# Patient Record
Sex: FEMALE | Race: WHITE | NOT HISPANIC OR LATINO | Employment: FULL TIME | URBAN - METROPOLITAN AREA
[De-identification: names, ages, dates, MRNs, and addresses within clinical notes are randomized per-mention and may not be internally consistent; named-entity substitution may affect disease eponyms.]

---

## 2017-11-02 ENCOUNTER — CONVERSION ENCOUNTER (OUTPATIENT)
Dept: GENERAL RADIOLOGY | Facility: HOSPITAL | Age: 47
End: 2017-11-02

## 2018-03-21 ENCOUNTER — OFFICE VISIT CONVERTED (OUTPATIENT)
Dept: FAMILY MEDICINE CLINIC | Facility: CLINIC | Age: 48
End: 2018-03-21
Attending: NURSE PRACTITIONER

## 2018-08-06 ENCOUNTER — OFFICE VISIT CONVERTED (OUTPATIENT)
Dept: FAMILY MEDICINE CLINIC | Facility: CLINIC | Age: 48
End: 2018-08-06
Attending: NURSE PRACTITIONER

## 2018-11-05 ENCOUNTER — CONVERSION ENCOUNTER (OUTPATIENT)
Dept: GENERAL RADIOLOGY | Facility: HOSPITAL | Age: 48
End: 2018-11-05

## 2018-11-27 ENCOUNTER — CONVERSION ENCOUNTER (OUTPATIENT)
Dept: FAMILY MEDICINE CLINIC | Facility: CLINIC | Age: 48
End: 2018-11-27

## 2018-11-27 ENCOUNTER — OFFICE VISIT CONVERTED (OUTPATIENT)
Dept: FAMILY MEDICINE CLINIC | Facility: CLINIC | Age: 48
End: 2018-11-27
Attending: NURSE PRACTITIONER

## 2019-08-26 ENCOUNTER — HOSPITAL ENCOUNTER (OUTPATIENT)
Dept: URGENT CARE | Facility: CLINIC | Age: 49
Discharge: HOME | End: 2019-08-26

## 2019-12-02 ENCOUNTER — HOSPITAL ENCOUNTER (OUTPATIENT)
Dept: FAMILY MEDICINE CLINIC | Facility: CLINIC | Age: 49
Discharge: HOME | End: 2019-12-02
Attending: NURSE PRACTITIONER

## 2019-12-02 ENCOUNTER — OFFICE VISIT CONVERTED (OUTPATIENT)
Dept: FAMILY MEDICINE CLINIC | Facility: CLINIC | Age: 49
End: 2019-12-02
Attending: NURSE PRACTITIONER

## 2019-12-04 ENCOUNTER — HOSPITAL ENCOUNTER (OUTPATIENT)
Dept: LAB | Facility: HOSPITAL | Age: 49
Discharge: HOME | End: 2019-12-04
Attending: NURSE PRACTITIONER

## 2019-12-04 LAB
ALBUMIN SERPL-MCNC: 4.6 G/DL (ref 3.5–5)
ALBUMIN/GLOB SERPL: 1.8 {RATIO} (ref 1.4–2.6)
ALP SERPL-CCNC: 55 U/L (ref 42–98)
ALT SERPL-CCNC: 18 U/L (ref 10–40)
ANION GAP SERPL CALC-SCNC: 16 MMOL/L (ref 8–19)
AST SERPL-CCNC: 22 U/L (ref 15–50)
BASOPHILS # BLD AUTO: 0.04 10*3/UL (ref 0–0.2)
BASOPHILS NFR BLD AUTO: 0.9 % (ref 0–3)
BILIRUB SERPL-MCNC: 0.56 MG/DL (ref 0.2–1.3)
BUN SERPL-MCNC: 20 MG/DL (ref 5–25)
BUN/CREAT SERPL: 24 {RATIO} (ref 6–20)
CALCIUM SERPL-MCNC: 9.7 MG/DL (ref 8.7–10.4)
CHLORIDE SERPL-SCNC: 99 MMOL/L (ref 99–111)
CHOLEST SERPL-MCNC: 224 MG/DL (ref 107–200)
CHOLEST/HDLC SERPL: 3.4 {RATIO} (ref 3–6)
CONV ABS IMM GRAN: 0.01 10*3/UL (ref 0–0.2)
CONV CO2: 27 MMOL/L (ref 22–32)
CONV IMMATURE GRAN: 0.2 % (ref 0–1.8)
CONV TOTAL PROTEIN: 7.2 G/DL (ref 6.3–8.2)
CREAT UR-MCNC: 0.85 MG/DL (ref 0.5–0.9)
DEPRECATED RDW RBC AUTO: 41.7 FL (ref 36.4–46.3)
EOSINOPHIL # BLD AUTO: 0.15 10*3/UL (ref 0–0.7)
EOSINOPHIL # BLD AUTO: 3.2 % (ref 0–7)
ERYTHROCYTE [DISTWIDTH] IN BLOOD BY AUTOMATED COUNT: 12.2 % (ref 11.7–14.4)
GFR SERPLBLD BASED ON 1.73 SQ M-ARVRAT: >60 ML/MIN/{1.73_M2}
GLOBULIN UR ELPH-MCNC: 2.6 G/DL (ref 2–3.5)
GLUCOSE SERPL-MCNC: 91 MG/DL (ref 65–99)
HCT VFR BLD AUTO: 40.6 % (ref 37–47)
HDLC SERPL-MCNC: 66 MG/DL (ref 40–60)
HGB BLD-MCNC: 13.3 G/DL (ref 12–16)
LDLC SERPL CALC-MCNC: 144 MG/DL (ref 70–100)
LYMPHOCYTES # BLD AUTO: 2.11 10*3/UL (ref 1–5)
LYMPHOCYTES NFR BLD AUTO: 45 % (ref 20–45)
MCH RBC QN AUTO: 30.2 PG (ref 27–31)
MCHC RBC AUTO-ENTMCNC: 32.8 G/DL (ref 33–37)
MCV RBC AUTO: 92.3 FL (ref 81–99)
MONOCYTES # BLD AUTO: 0.55 10*3/UL (ref 0.2–1.2)
MONOCYTES NFR BLD AUTO: 11.7 % (ref 3–10)
NEUTROPHILS # BLD AUTO: 1.83 10*3/UL (ref 2–8)
NEUTROPHILS NFR BLD AUTO: 39 % (ref 30–85)
NRBC CBCN: 0 % (ref 0–0.7)
OSMOLALITY SERPL CALC.SUM OF ELEC: 288 MOSM/KG (ref 273–304)
PLATELET # BLD AUTO: 214 10*3/UL (ref 130–400)
PMV BLD AUTO: 12.2 FL (ref 9.4–12.3)
POTASSIUM SERPL-SCNC: 4.2 MMOL/L (ref 3.5–5.3)
RBC # BLD AUTO: 4.4 10*6/UL (ref 4.2–5.4)
SODIUM SERPL-SCNC: 138 MMOL/L (ref 135–147)
TRIGL SERPL-MCNC: 68 MG/DL (ref 40–150)
TSH SERPL-ACNC: 1.77 M[IU]/L (ref 0.27–4.2)
VLDLC SERPL-MCNC: 14 MG/DL (ref 5–37)
WBC # BLD AUTO: 4.69 10*3/UL (ref 4.8–10.8)

## 2019-12-06 LAB
CONV LAST MENSTURAL PERIOD: NORMAL
SPECIMEN SOURCE: NORMAL
SPECIMEN SOURCE: NORMAL
THIN PREP CVX: NORMAL

## 2019-12-17 ENCOUNTER — HOSPITAL ENCOUNTER (OUTPATIENT)
Dept: GENERAL RADIOLOGY | Facility: HOSPITAL | Age: 49
Discharge: HOME | End: 2019-12-17
Attending: NURSE PRACTITIONER

## 2020-10-28 ENCOUNTER — OFFICE VISIT CONVERTED (OUTPATIENT)
Dept: FAMILY MEDICINE CLINIC | Facility: CLINIC | Age: 50
End: 2020-10-28
Attending: NURSE PRACTITIONER

## 2020-11-04 ENCOUNTER — HOSPITAL ENCOUNTER (OUTPATIENT)
Dept: GENERAL RADIOLOGY | Facility: HOSPITAL | Age: 50
Discharge: HOME | End: 2020-11-04
Attending: NURSE PRACTITIONER

## 2020-11-04 ENCOUNTER — HOSPITAL ENCOUNTER (OUTPATIENT)
Dept: LAB | Facility: HOSPITAL | Age: 50
Discharge: HOME | End: 2020-11-04
Attending: NURSE PRACTITIONER

## 2020-11-04 LAB
ALBUMIN SERPL-MCNC: 4.3 G/DL (ref 3.5–5)
ALBUMIN/GLOB SERPL: 1.6 {RATIO} (ref 1.4–2.6)
ALP SERPL-CCNC: 63 U/L (ref 53–141)
ALT SERPL-CCNC: 35 U/L (ref 10–40)
ANION GAP SERPL CALC-SCNC: 20 MMOL/L (ref 8–19)
AST SERPL-CCNC: 29 U/L (ref 15–50)
BASOPHILS # BLD AUTO: 0.05 10*3/UL (ref 0–0.2)
BASOPHILS NFR BLD AUTO: 1.2 % (ref 0–3)
BILIRUB SERPL-MCNC: 0.43 MG/DL (ref 0.2–1.3)
BUN SERPL-MCNC: 19 MG/DL (ref 5–25)
BUN/CREAT SERPL: 21 {RATIO} (ref 6–20)
CALCIUM SERPL-MCNC: 10.3 MG/DL (ref 8.7–10.4)
CHLORIDE SERPL-SCNC: 103 MMOL/L (ref 99–111)
CHOLEST SERPL-MCNC: 253 MG/DL (ref 107–200)
CHOLEST/HDLC SERPL: 3.8 {RATIO} (ref 3–6)
CONV ABS IMM GRAN: 0.02 10*3/UL (ref 0–0.2)
CONV CO2: 24 MMOL/L (ref 22–32)
CONV IMMATURE GRAN: 0.5 % (ref 0–1.8)
CONV TOTAL PROTEIN: 7 G/DL (ref 6.3–8.2)
CREAT UR-MCNC: 0.9 MG/DL (ref 0.5–0.9)
DEPRECATED RDW RBC AUTO: 40.8 FL (ref 36.4–46.3)
EOSINOPHIL # BLD AUTO: 0.2 10*3/UL (ref 0–0.7)
EOSINOPHIL # BLD AUTO: 4.8 % (ref 0–7)
ERYTHROCYTE [DISTWIDTH] IN BLOOD BY AUTOMATED COUNT: 12.3 % (ref 11.7–14.4)
GFR SERPLBLD BASED ON 1.73 SQ M-ARVRAT: >60 ML/MIN/{1.73_M2}
GLOBULIN UR ELPH-MCNC: 2.7 G/DL (ref 2–3.5)
GLUCOSE SERPL-MCNC: 94 MG/DL (ref 65–99)
HCT VFR BLD AUTO: 41.4 % (ref 37–47)
HDLC SERPL-MCNC: 67 MG/DL (ref 40–60)
HGB BLD-MCNC: 13.3 G/DL (ref 12–16)
LDLC SERPL CALC-MCNC: 171 MG/DL (ref 70–100)
LYMPHOCYTES # BLD AUTO: 2.1 10*3/UL (ref 1–5)
LYMPHOCYTES NFR BLD AUTO: 50.1 % (ref 20–45)
MCH RBC QN AUTO: 29.2 PG (ref 27–31)
MCHC RBC AUTO-ENTMCNC: 32.1 G/DL (ref 33–37)
MCV RBC AUTO: 91 FL (ref 81–99)
MONOCYTES # BLD AUTO: 0.43 10*3/UL (ref 0.2–1.2)
MONOCYTES NFR BLD AUTO: 10.3 % (ref 3–10)
NEUTROPHILS # BLD AUTO: 1.39 10*3/UL (ref 2–8)
NEUTROPHILS NFR BLD AUTO: 33.1 % (ref 30–85)
NRBC CBCN: 0 % (ref 0–0.7)
OSMOLALITY SERPL CALC.SUM OF ELEC: 296 MOSM/KG (ref 273–304)
PLATELET # BLD AUTO: 219 10*3/UL (ref 130–400)
PMV BLD AUTO: 11.2 FL (ref 9.4–12.3)
POTASSIUM SERPL-SCNC: 4.5 MMOL/L (ref 3.5–5.3)
RBC # BLD AUTO: 4.55 10*6/UL (ref 4.2–5.4)
SODIUM SERPL-SCNC: 142 MMOL/L (ref 135–147)
TRIGL SERPL-MCNC: 75 MG/DL (ref 40–150)
VLDLC SERPL-MCNC: 15 MG/DL (ref 5–37)
WBC # BLD AUTO: 4.19 10*3/UL (ref 4.8–10.8)

## 2021-01-12 ENCOUNTER — HOSPITAL ENCOUNTER (OUTPATIENT)
Dept: GENERAL RADIOLOGY | Facility: HOSPITAL | Age: 51
Discharge: HOME | End: 2021-01-12
Attending: NURSE PRACTITIONER

## 2021-05-13 NOTE — PROGRESS NOTES
Progress Note      Patient Name: Carmen Vidal   Patient ID: [id number]   Sex: Female   YOB: 1970    Primary Care Provider: Luther LUGO    Visit Date: October 28, 2020    Provider: PARISH Jordan   Location: [facility]   Location Address: [address] [place] [address], [address] [place] [address]  [id number]   Location Phone: [phone]          Chief Complaint     The patient is here for a f/u of menopausal  symptoms.       History Of Present Illness  Carmen Vidal is a 50 year old /White female who presents for evaluation and treatment of:      If can't sit for long periods. has right hip pain on the outside. having pain in back and radiates to feet transiently.    Left hip pain on outside of hip.  Also back pain that radiates to foot at times.       Past Medical History  Disease Name Date Onset Notes   Allergy --  --    Anemia --  --    Arthritis --  --    Hemorrhoids --  --    Night sweats --  --          Past Surgical History  Procedure Name Date Notes   Breast augmentation 2016 --    Tubal ligation 1997 --          Medication List  Name Date Started Instructions   Amberern  1 daily   diclofenac sodium 75 mg oral tablet,delayed release (DR/EC) 10/14/2020 TAKE ONE TABLET BY MOUTH TWICE A DAY for 14 days         Allergy List  Allergen Name Date Reaction Notes   NO KNOWN DRUG ALLERGIES --  --  --        Allergies Reconciled  Social History  Finding Status Start/Stop Quantity Notes   Alcohol --  --/-- --  --    Tobacco Former 22/42 1 ppd smoked 20 years         Immunizations  NameDate Admin Mfg Trade Name Lot Number Route Inj VIS Given VIS Publication   InfluenzaRefused 10/28/2020 NE Not Entered  NE NE     Comments:          Review of Systems  · Constitutional  o Denies  o : fever, fatigue, weight loss, weight gain  · Cardiovascular  o Denies  o : lower extremity edema, claudication, chest pressure, palpitations  · Respiratory  o Denies  o : shortness of  "breath, wheezing, cough, hemoptysis, dyspnea on exertion  · Gastrointestinal  o Denies  o : nausea, vomiting, diarrhea, constipation, abdominal pain      Vitals  Date Time BP Position Site L\R Cuff Size HR RR TEMP (F) WT  HT  BMI kg/m2 BSA m2 O2 Sat FR L/min FiO2 HC       10/28/2020 03:44 /74 Sitting    76 - R 16 97.6 166lbs 16oz 5'  4\" 28.67 1.85             Physical Examination  · Constitutional  o Appearance  o : well-nourished, well developed, alert, in no acute distress  · Eyes  o Conjunctivae  o : conjunctivae normal  o Sclerae  o : sclerae white  o Pupils and Irises  o : pupils equal, round, and reactive to light and accommodation bilaterally  o Corneas  o : tear film normal, no lesions present  o Eyelids/Ocular Adnexae  o : eyelid appearance normal, no exudates present, eye moisture level normal  · Respiratory  o Respiratory Effort  o : breathing unlabored  o Inspection of Chest  o : normal appearance, no retractions  o Auscultation of Lungs  o : normal breath sounds throughout  · Cardiovascular  o Heart  o :   § Auscultation of Heart  § : regular rate and rhythm without murmur, PMI normal  o Peripheral Vascular System  o :   § Extremities  § : no cyanosis, clubbing or edema; less than 2 second refill noted  · Musculoskeletal  o General  o : No joint swelling or deformity noted. Muscle tone, strength and development grossly normal. Pain mid lumbar spine in to paraspinous muscles  · Skin and Subcutaneous Tissue  o General Inspection  o : no rashes or lesions present, no areas of discoloration  · Neurologic  o Mental Status Examination  o : judgement, insight intact, modd and affect appropriate  o Motor Examination  o : strength grossly intact in all four extremities  o Gait and Station  o : normal gait, able to stand without difficulty          Assessment  · Screening for depression     V79.0/Z13.89  · Lumbago     724.2/M54.5  · Hip pain, acute, right     719.45/M25.551  · Elevated " cholesterol     272.0/E78.00       menopausal symptoms.  Stopped Zoloft due to side effects.  If needs to go back on something will call and try another medication.       Plan  · Orders  o Annual depression screening, 15 minutes (34304, ) - V79.0/Z13.89 - 10/28/2020  o ACO-18: Negative screen for clinical depression using a standardized tool () - V79.0/Z13.89 - 10/28/2020  o Lumbar Spine Complete [facility] (99032) - 724.2/M54.5 - 10/28/2020  o CBC with Auto Diff [facility] (55912) - 272.0/E78.00 - 10/28/2020  o CMP [facility] ([id number]) - 272.0/E78.00 - 10/28/2020  o Lipid Panel [facility] (36057) - 272.0/E78.00 - 10/28/2020  o ACO-39: Current medications updated and reviewed (1159F, ) - - 10/28/2020  o ACO-14: Influenza immunization was not administered for reasons documented [facility] () - - 10/28/2020  o Hip (Right) 2 or more views (includes AP Pelvis) X-Ray [facility] Preferred View. ([id number]) - 719.45/M25.551 - 10/28/2020  · Medications  o Medications have been Reconciled  o Transition of Care or Provider Policy  · Instructions  o Depression Screen completed and scanned into the EMR under the designated folder within the patient's documents.  o Today's PHQ-9 result is _5__  o The provider screening met the required time of 15 minutes.  o Patient was educated/instructed on their diagnosis, treatment and medications prior to discharge from the clinic today.  o Minutes spent with patient including greater than 50% in Education/Counseling/Care Coordination.  o Time spent with the patient was minutes, more than 50% face to face.  · Disposition  o F/U in 1 year            Electronically Signed by: PARISH Jordan -Author on October 28, 2020 04:43:50 PM

## 2021-05-14 VITALS
WEIGHT: 167 LBS | HEART RATE: 76 BPM | BODY MASS INDEX: 28.51 KG/M2 | SYSTOLIC BLOOD PRESSURE: 116 MMHG | HEIGHT: 64 IN | DIASTOLIC BLOOD PRESSURE: 74 MMHG | TEMPERATURE: 97.6 F | RESPIRATION RATE: 16 BRPM

## 2021-05-15 VITALS
TEMPERATURE: 97.2 F | DIASTOLIC BLOOD PRESSURE: 79 MMHG | WEIGHT: 154 LBS | RESPIRATION RATE: 16 BRPM | BODY MASS INDEX: 26.29 KG/M2 | SYSTOLIC BLOOD PRESSURE: 128 MMHG | HEIGHT: 64 IN | HEART RATE: 78 BPM | OXYGEN SATURATION: 100 %

## 2021-05-16 VITALS
WEIGHT: 175 LBS | RESPIRATION RATE: 18 BRPM | DIASTOLIC BLOOD PRESSURE: 80 MMHG | HEIGHT: 64 IN | BODY MASS INDEX: 29.88 KG/M2 | TEMPERATURE: 97.6 F | SYSTOLIC BLOOD PRESSURE: 130 MMHG | HEART RATE: 74 BPM | OXYGEN SATURATION: 98 %

## 2021-05-16 VITALS
HEIGHT: 64 IN | RESPIRATION RATE: 16 BRPM | WEIGHT: 180 LBS | BODY MASS INDEX: 30.73 KG/M2 | OXYGEN SATURATION: 96 % | SYSTOLIC BLOOD PRESSURE: 104 MMHG | HEART RATE: 83 BPM | DIASTOLIC BLOOD PRESSURE: 64 MMHG | TEMPERATURE: 98.2 F

## 2021-05-16 VITALS
HEART RATE: 70 BPM | SYSTOLIC BLOOD PRESSURE: 116 MMHG | WEIGHT: 170 LBS | DIASTOLIC BLOOD PRESSURE: 62 MMHG | RESPIRATION RATE: 16 BRPM | TEMPERATURE: 97.1 F | OXYGEN SATURATION: 98 % | BODY MASS INDEX: 29.02 KG/M2 | HEIGHT: 64 IN

## 2021-07-01 RX ORDER — DICLOFENAC SODIUM 75 MG/1
TABLET, DELAYED RELEASE ORAL
Qty: 90 TABLET | Refills: 0 | Status: SHIPPED | OUTPATIENT
Start: 2021-07-01 | End: 2021-09-24 | Stop reason: SDUPTHER

## 2021-09-24 ENCOUNTER — OFFICE VISIT (OUTPATIENT)
Dept: FAMILY MEDICINE CLINIC | Facility: CLINIC | Age: 51
End: 2021-09-24

## 2021-09-24 VITALS
DIASTOLIC BLOOD PRESSURE: 70 MMHG | SYSTOLIC BLOOD PRESSURE: 122 MMHG | TEMPERATURE: 97.1 F | OXYGEN SATURATION: 98 % | BODY MASS INDEX: 27.68 KG/M2 | RESPIRATION RATE: 16 BRPM | WEIGHT: 162.1 LBS | HEART RATE: 67 BPM | HEIGHT: 64 IN

## 2021-09-24 DIAGNOSIS — G89.29 CHRONIC RIGHT-SIDED LOW BACK PAIN WITH RIGHT-SIDED SCIATICA: ICD-10-CM

## 2021-09-24 DIAGNOSIS — M25.551 HIP PAIN, CHRONIC, RIGHT: ICD-10-CM

## 2021-09-24 DIAGNOSIS — R43.9 SMELL DISTURBANCE: ICD-10-CM

## 2021-09-24 DIAGNOSIS — R51.9 NONINTRACTABLE HEADACHE, UNSPECIFIED CHRONICITY PATTERN, UNSPECIFIED HEADACHE TYPE: ICD-10-CM

## 2021-09-24 DIAGNOSIS — L98.9 SKIN LESION: ICD-10-CM

## 2021-09-24 DIAGNOSIS — E78.00 ELEVATED CHOLESTEROL: ICD-10-CM

## 2021-09-24 DIAGNOSIS — Z12.11 SCREEN FOR COLON CANCER: Primary | ICD-10-CM

## 2021-09-24 DIAGNOSIS — M54.41 CHRONIC RIGHT-SIDED LOW BACK PAIN WITH RIGHT-SIDED SCIATICA: ICD-10-CM

## 2021-09-24 DIAGNOSIS — G89.29 HIP PAIN, CHRONIC, RIGHT: ICD-10-CM

## 2021-09-24 PROBLEM — Z88.9 PREDISPOSITION TO ALLERGIC REACTION: Status: ACTIVE | Noted: 2021-09-24

## 2021-09-24 PROBLEM — K64.9 HEMORRHOIDS: Status: ACTIVE | Noted: 2021-09-24

## 2021-09-24 PROBLEM — R61 NIGHT SWEATS: Status: ACTIVE | Noted: 2021-09-24

## 2021-09-24 PROBLEM — D64.9 ANEMIA: Status: ACTIVE | Noted: 2021-09-24

## 2021-09-24 PROBLEM — M19.90 ARTHRITIS: Status: ACTIVE | Noted: 2021-09-24

## 2021-09-24 PROCEDURE — 99214 OFFICE O/P EST MOD 30 MIN: CPT | Performed by: NURSE PRACTITIONER

## 2021-09-24 RX ORDER — DICLOFENAC SODIUM 75 MG/1
75 TABLET, DELAYED RELEASE ORAL DAILY
Qty: 90 TABLET | Refills: 0 | Status: SHIPPED | OUTPATIENT
Start: 2021-09-24 | End: 2021-12-20

## 2021-09-24 NOTE — PROGRESS NOTES
Chief Complaint  Skin Lesion, Hip Pain (left), and Jaw Pain (clenching teeth)    Subjective        Carmen Vidal presents to [facility]  Jaw pain:  Clenching teeth  States isn't stressed.  But if is focused will clench teeth.  Has a bite guard at night.    Hip pain on left hip.  Has been going on for 2.5 years.  Will be a little annoying in the day.  But is now every night.  Can't lay on sides.  Pain on outside of hip.  Sciatic issues.       Skin lesion:  Has an area on right upper arm where a lesion was removed and is not red.    2013 with a change of smell.  Has had a MRI of brain that was negative.  Did go away but has returned.  When occurs will get a headache as well with this episode.  No change in taste and can smell other things.        Past History:    Medical History: has a past medical history of Allergy, Anemia, Arthritis, Hemorrhoids, and Night sweats.     Surgical History: has a past surgical history that includes Breast Augmentation (2016) and Tubal ligation (1997).     Family History: Family history is unknown by patient.     Social History: reports that she has quit smoking. Her smoking use included cigarettes. She smoked 1.00 pack per day. She does not have any smokeless tobacco history on file.    Allergies: Patient has no known allergies.          Current Outpatient Medications:   •  diclofenac (VOLTAREN) 75 MG EC tablet, Take 1 tablet by mouth Daily., Disp: 90 tablet, Rfl: 0    Medications Discontinued During This Encounter   Medication Reason   • diclofenac (VOLTAREN) 75 MG EC tablet Reorder         Review of Systems   Constitutional: Negative for fever.   Respiratory: Negative for cough and shortness of breath.    Cardiovascular: Negative for chest pain, palpitations and leg swelling.   Neurological: Positive for headache. Negative for dizziness, weakness and numbness.        Objective         Vitals:    09/24/21 1025   BP: 122/70   BP Location: Left arm  "  Patient Position: Sitting   Cuff Size: Adult   Pulse: 67   Resp: 16   Temp: 97.1 °F (36.2 °C)   TempSrc: Infrared   SpO2: 98%   Weight: 73.5 kg (162 lb 1.6 oz)   Height: 162.6 cm (64\")     Body mass index is 27.82 kg/m².         Physical Exam  Vitals reviewed.   Constitutional:       Appearance: Normal appearance. She is well-developed.   HENT:      Head: Normocephalic and atraumatic.      Mouth/Throat:      Pharynx: No oropharyngeal exudate.   Eyes:      Conjunctiva/sclera: Conjunctivae normal.      Pupils: Pupils are equal, round, and reactive to light.   Cardiovascular:      Rate and Rhythm: Normal rate and regular rhythm.      Heart sounds: No murmur heard.   No friction rub. No gallop.    Pulmonary:      Effort: Pulmonary effort is normal.      Breath sounds: Normal breath sounds. No wheezing or rhonchi.   Skin:     General: Skin is warm and dry.   Neurological:      Mental Status: She is alert and oriented to person, place, and time.   Psychiatric:         Mood and Affect: Mood and affect normal.         Behavior: Behavior normal.         Thought Content: Thought content normal.         Judgment: Judgment normal.             Result Review :               Assessment and Plan     Diagnoses and all orders for this visit:    1. Screen for colon cancer (Primary)  -     Amb referral for Screening Colonoscopy    2. Chronic right-sided low back pain with right-sided sciatica  -     MRI Lumbar Spine Without Contrast; Future  -     Urinalysis With Culture If Indicated -; Future    3. Hip pain, chronic, right  -     MRI Hip Right Without Contrast; Future    4. Elevated cholesterol  -     Lipid Panel w/ Chol/HDL Ratio; Future  -     Comprehensive metabolic panel; Future    5. Smell disturbance    6. Nonintractable headache, unspecified chronicity pattern, unspecified headache type  -     MRI Brain Without Contrast; Future    7. Skin lesion  -     Ambulatory Referral to Dermatology    Other orders  -     diclofenac " (VOLTAREN) 75 MG EC tablet; Take 1 tablet by mouth Daily.  Dispense: 90 tablet; Refill: 0              Follow Up     Return in about 6 months (around 3/24/2022) for Next scheduled follow up.    Patient was given instructions and counseling regarding her condition or for health maintenance advice. Please see specific information pulled into the AVS if appropriate.

## 2021-10-13 ENCOUNTER — HOSPITAL ENCOUNTER (OUTPATIENT)
Dept: MRI IMAGING | Facility: HOSPITAL | Age: 51
Discharge: HOME | End: 2021-10-13

## 2021-10-13 DIAGNOSIS — G89.29 HIP PAIN, CHRONIC, RIGHT: ICD-10-CM

## 2021-10-13 DIAGNOSIS — G89.29 CHRONIC RIGHT-SIDED LOW BACK PAIN WITH RIGHT-SIDED SCIATICA: ICD-10-CM

## 2021-10-13 DIAGNOSIS — R51.9 NONINTRACTABLE HEADACHE, UNSPECIFIED CHRONICITY PATTERN, UNSPECIFIED HEADACHE TYPE: ICD-10-CM

## 2021-10-13 DIAGNOSIS — M54.41 CHRONIC RIGHT-SIDED LOW BACK PAIN WITH RIGHT-SIDED SCIATICA: ICD-10-CM

## 2021-10-13 DIAGNOSIS — M25.551 HIP PAIN, CHRONIC, RIGHT: ICD-10-CM

## 2021-10-13 PROCEDURE — 73721 MRI JNT OF LWR EXTRE W/O DYE: CPT

## 2021-10-13 PROCEDURE — 70551 MRI BRAIN STEM W/O DYE: CPT

## 2021-10-13 PROCEDURE — 72148 MRI LUMBAR SPINE W/O DYE: CPT

## 2021-10-19 ENCOUNTER — OFFICE VISIT (OUTPATIENT)
Dept: ORTHOPEDIC SURGERY | Facility: CLINIC | Age: 51
End: 2021-10-19

## 2021-10-19 VITALS — HEIGHT: 63 IN | RESPIRATION RATE: 14 BRPM | BODY MASS INDEX: 27.46 KG/M2 | WEIGHT: 155 LBS

## 2021-10-19 DIAGNOSIS — M70.61 TROCHANTERIC BURSITIS OF RIGHT HIP: Primary | ICD-10-CM

## 2021-10-19 PROCEDURE — 99203 OFFICE O/P NEW LOW 30 MIN: CPT | Performed by: ORTHOPAEDIC SURGERY

## 2021-10-19 NOTE — PROGRESS NOTES
"Chief Complaint  Pain of the Right Hip     Subjective      Carmen Vidal presents to [facility] for an evaluation of right hip. Patient states she has been having right hip pain since January of 2019 after moving homes. She has been having lateral sided hip pain ever since that progressively gotten worse overtime. Pain is worse at night. She has pain even at rest. Pain is made worse with laying on the right side. She denies any groin pain. She takes turmeric and diclofenac with little relief. She has a history of low back pain and does have some pain radiating down her leg.     No Known Allergies     Social History     Socioeconomic History   • Marital status:    Tobacco Use   • Smoking status: Former Smoker     Packs/day: 1.00     Types: Cigarettes   • Smokeless tobacco: Never Used   • Tobacco comment: SMOKED FOR 20 YEARS        Review of Systems     Objective   Vital Signs:   Resp 14   Ht 160 cm (63\")   Wt 70.3 kg (155 lb)   BMI 27.46 kg/m²       Physical Exam  Constitutional:       Appearance: Normal appearance. The patient is well-developed and normal weight.   HENT:      Head: Normocephalic.      Right Ear: Hearing and external ear normal.      Left Ear: Hearing and external ear normal.      Nose: Nose normal.   Eyes:      Conjunctiva/sclera: Conjunctivae normal.   Cardiovascular:      Rate and Rhythm: Normal rate.   Pulmonary:      Effort: Pulmonary effort is normal.      Breath sounds: No wheezing or rales.   Abdominal:      Palpations: Abdomen is soft.      Tenderness: There is no abdominal tenderness.   Musculoskeletal:      Cervical back: Normal range of motion.   Skin:     Findings: No rash.   Neurological:      Mental Status: The patient is alert and oriented to person, place, and time.   Psychiatric:         Mood and Affect: Mood and affect normal.         Judgment: Judgment normal.       Ortho Exam      RIGHT HIP: Active flexion. Tender lateral hip. 5 out of 5 " strength. Neurovascular intact. Negative leg raise. No groin pain. Good tone of hip flexors, hip extensors, hip adductor, hip abductors.     Procedures      Imaging Results (Most Recent)     None           Result Review :     MRI Hip Right Without Contrast    Result Date: 10/13/2021  Narrative: PROCEDURE: MRI HIP RIGHT WO CONTRAST  COMPARISON:  None INDICATIONS: RIGHT HIP PAIN RADIATING DOWN RIGHT LEG WITH NUMBNESS AND TINGLING.      TECHNIQUE: A comprehensive examination was performed utilizing a variety of imaging planes and imaging parameters to optimize visualization of suspected pathology.  Images were performed without contrast.  FINDINGS:  Today's study is compared to previous plain film images of the right hip joint and pelvis performed on 11/4/2020.  Today's study reveals a small focal area of increased T2 weighted signal in the area of the right quadratus femora is muscle located posterior inferior to the right hip joint.  This finding is most consistent with inflammation and edema caused by trauma and partial tearing of this muscle.  No evidence of abnormality in the area the right sciatic nerve.  No evidence of fracture or dislocation or effusion or bone tumor or a vascular necrosis in the right hip joint.  No evidence of arthritis in the right hip joint no evidence of mass or adenopathy or fluid within the pelvis.  The uterus and adnexal areas are normal.  The left hip joint is normal.  Mild inflammation and edema in the muscular attachments overlying the right and left greater trochanter of the right left hip joints more prominent on the left than on the right consistent with mild chronic trauma and injury.  CONCLUSION:  1. Small focal area of increased T2 weighted signal in the right quadratus femoris muscle located posterior inferior to the right hip joint consistent with inflammation and edema caused by trauma and partial tearing.     BRUCE CODY MD       Electronically Signed and Approved  By: BRUCE CODY MD on 10/13/2021 at 11:14                 HIP X-RAY    PROCEDURE: 2 VIEW RIGHT HIP  WITH SINGLE VIEW AP PELVIS         COMPARISON: None.         INDICATIONS: CHRONIC RIGHT HIP PAIN. NO KNOWN INJURY. PAIN IS LATERAL AND NOW KEEPS HER UP AT     NIGHT.         FINDINGS:     Mild hip arthrosis.  There is no fracture, dislocation, or aggressive appearing bone change.         CONCLUSION: No acute findings.  Mild degenerative change.          ALFONSO PADILLA MD           Electronically Signed and Approved     Assessment and Plan     DX: Right hip trochanteric bursitis    Reviewed x-rays and MRI with the patient. Discussed diagnosis and treatment plans with the patient. Advised patient should try a steroid injection however she denies this. She opted for home exercises program which was provided. May consider injection next visit.     Call or return if worsening symptoms.    Follow Up     6-8 weeks.       Patient was given instructions and counseling regarding her condition or for health maintenance advice. Please see specific information pulled into the AVS if appropriate.     Scribed for Luis Carlos Damon MD by Corina Fernandez.   10/19/21   15:20 EDT    I have personally performed the services described in this document as scribed by the above individual and it is both accurate and complete. Luis Carlos Damon MD 10/25/21

## 2021-11-01 ENCOUNTER — PREP FOR SURGERY (OUTPATIENT)
Dept: OTHER | Facility: HOSPITAL | Age: 51
End: 2021-11-01

## 2021-11-01 ENCOUNTER — OFFICE VISIT (OUTPATIENT)
Dept: SURGERY | Facility: CLINIC | Age: 51
End: 2021-11-01

## 2021-11-01 VITALS — OXYGEN SATURATION: 99 % | BODY MASS INDEX: 27.49 KG/M2 | HEIGHT: 63 IN | HEART RATE: 71 BPM | WEIGHT: 155.13 LBS

## 2021-11-01 DIAGNOSIS — Z12.11 SCREENING FOR MALIGNANT NEOPLASM OF COLON: Primary | ICD-10-CM

## 2021-11-01 PROCEDURE — 99202 OFFICE O/P NEW SF 15 MIN: CPT | Performed by: NURSE PRACTITIONER

## 2021-11-01 RX ORDER — POLYETHYLENE GLYCOL 3350 17 G/17G
POWDER, FOR SOLUTION ORAL
Qty: 238 PACKET | Refills: 0 | Status: SHIPPED | OUTPATIENT
Start: 2021-11-01

## 2021-11-01 RX ORDER — SODIUM CHLORIDE 0.9 % (FLUSH) 0.9 %
3 SYRINGE (ML) INJECTION EVERY 12 HOURS SCHEDULED
Status: CANCELLED | OUTPATIENT
Start: 2021-11-01

## 2021-11-01 RX ORDER — SODIUM CHLORIDE 0.9 % (FLUSH) 0.9 %
10 SYRINGE (ML) INJECTION AS NEEDED
Status: CANCELLED | OUTPATIENT
Start: 2021-11-01

## 2021-11-01 NOTE — PROGRESS NOTES
"Chief Complaint: Colonoscopy (Patient has never had a colonoscopy before. No FHx of Colon Cancer. Patient states she struggles with constipation.) and Constipation    Subjective      Colonoscopy consultation         History of Present Illness  Carmen Vidal is a 51 y.o. female presents to [facility] for colonoscopy consultation.    Patient presents today on referral from [facility] for colonoscopy consultation.    Patient denies any abdominal pain, change in bowel habit, rectal bleeding.    Denies any family history of colorectal cancer.    No previous colonoscopy.    Objective     Past Medical History:   Diagnosis Date   • Allergy    • Anemia    • Arthritis    • Hemorrhoids    • Night sweats        Past Surgical History:   Procedure Laterality Date   • BREAST AUGMENTATION  2016   • TUBAL ABDOMINAL LIGATION  1997         Current Outpatient Medications:   •  diclofenac (VOLTAREN) 75 MG EC tablet, Take 1 tablet by mouth Daily., Disp: 90 tablet, Rfl: 0  •  polyethylene glycol (MIRALAX) 17 g packet, Take as directed.  Instructions given in office.  Dispense: 238 g bottle, Disp: 238 packet, Rfl: 0    No Known Allergies     Family History   Family history unknown: Yes       Social History     Socioeconomic History   • Marital status:    Tobacco Use   • Smoking status: Former Smoker     Packs/day: 1.00     Years: 20.00     Pack years: 20.00     Types: Cigarettes   • Smokeless tobacco: Never Used   Vaping Use   • Vaping Use: Never used   Substance and Sexual Activity   • Alcohol use: Yes     Comment: Ocassionally    • Drug use: Never   • Sexual activity: Defer       Review of Systems   Constitutional: Negative for chills and fever.   Gastrointestinal: Negative for abdominal distention, abdominal pain, anal bleeding, blood in stool, constipation, diarrhea and rectal pain.        Vital Signs:   Pulse 71   Ht 160 cm (63\")   Wt 70.4 kg (155 lb 2 oz)   SpO2 99%   BMI 27.48 " kg/m²      Physical Exam  Constitutional:       Appearance: Normal appearance.   HENT:      Head: Normocephalic.   Cardiovascular:      Rate and Rhythm: Normal rate.   Pulmonary:      Effort: Pulmonary effort is normal.   Abdominal:      General: Abdomen is flat.      Palpations: Abdomen is soft.   Skin:     General: Skin is warm and dry.   Neurological:      General: No focal deficit present.      Mental Status: She is alert and oriented to person, place, and time.   Psychiatric:         Mood and Affect: Mood normal.         Thought Content: Thought content normal.          Result Review :              []  Laboratory  []  Radiology  []  Pathology  []  Microbiology  []  EKG/Telemetry   []  Cardiology/Vascular   [x]  Old records  Today I have reviewed Luther Milton's previous office note.     Assessment and Plan    Diagnoses and all orders for this visit:    1. Screening for malignant neoplasm of colon (Primary)    Other orders  -     polyethylene glycol (MIRALAX) 17 g packet; Take as directed.  Instructions given in office.  Dispense: 238 g bottle  Dispense: 238 packet; Refill: 0        Follow Up   Return for Scheduled colonoscopy with Dr. Carty on 2/2/2022 at [facility].     Hospital arrival time is 6 AM    Possible risk/complications, benefits, and alternatives to surgical or invasive procedure have been explained to patient and/or legal guardian. Patient has been evaluated and can tolerate anesthesia and/or sedation. Risks, benefits, and alternatives to anesthesia and sedation have been explained to patient and/or legal guardian.  Patient verbalizes understanding is willing to proceed with the above plan.    Patient was given instructions and counseling regarding her condition or for health maintenance advice. Please see specific information pulled into the AVS if appropriate.

## 2021-11-29 PROBLEM — M70.61 TROCHANTERIC BURSITIS OF RIGHT HIP: Status: ACTIVE | Noted: 2021-11-29

## 2021-11-30 ENCOUNTER — OFFICE VISIT (OUTPATIENT)
Dept: ORTHOPEDIC SURGERY | Facility: CLINIC | Age: 51
End: 2021-11-30

## 2021-11-30 VITALS — BODY MASS INDEX: 26.82 KG/M2 | HEIGHT: 63 IN | WEIGHT: 151.4 LBS | OXYGEN SATURATION: 97 % | HEART RATE: 75 BPM

## 2021-11-30 DIAGNOSIS — M70.61 TROCHANTERIC BURSITIS OF RIGHT HIP: Primary | ICD-10-CM

## 2021-11-30 PROCEDURE — 99212 OFFICE O/P EST SF 10 MIN: CPT | Performed by: PHYSICIAN ASSISTANT

## 2021-11-30 NOTE — PROGRESS NOTES
"Chief Complaint  Pain and Follow-up of the Right Hip    Subjective          Carmen Vidal is a 51 y.o. female  presents to [facility] for   History of Present Illness    Patient presents for follow-up evaluation of right hip pain, right hip trochanteric bursitis.  Patient was last evaluated by Dr. Damon on 10/19/2021 he discussed treatment options with her including steroid injection/home exercise program, patient refused injection at that visit she states she has been working on weight loss she is doing home exercises watching her diet and she states this has been helping her hip pain.  She states she still has pain when she sits for long periods in the lateral hip she also states she has pain when laying on that side of her hip.  Patient would like to avoid injections at this time.  No new complaints.  No Known Allergies     Social History     Socioeconomic History   • Marital status:    Tobacco Use   • Smoking status: Former Smoker     Packs/day: 1.00     Years: 20.00     Pack years: 20.00     Types: Cigarettes   • Smokeless tobacco: Never Used   Vaping Use   • Vaping Use: Never used   Substance and Sexual Activity   • Alcohol use: Yes     Comment: Ocassionally    • Drug use: Never   • Sexual activity: Defer        REVIEW OF SYSTEMS    Constitutional: Denies fevers, chills, weight loss  Cardiovascular: Denies chest pain, shortness of breath  Skin: Denies rashes, acute skin changes  Neurologic: Denies headache, loss of consciousness  MSK: Right hip pain      Objective   Vital Signs:   Pulse 75   Ht 160 cm (63\")   Wt 68.7 kg (151 lb 6.4 oz)   SpO2 97%   BMI 26.82 kg/m²     Body mass index is 26.82 kg/m².    Physical Exam    Right hip: Tender to palpation of the lateral hip over the greater trochanter, 5 out of 5 strength, neurovascular intact, negative straight leg raise, no pain with rotation, full range of motion.    Procedures    Imaging Results (Most Recent)     " None           Result Review :{Labs  Result Review  Imaging  Med Tab  Media  Procedures :23}   The following data was reviewed by: HERMES Fitzgerald on 11/30/2021:               Assessment and Plan    Diagnoses and all orders for this visit:    1. Trochanteric bursitis of right hip (Primary)        Discussed diagnosis and treatment options with the patient patient will follow up as needed we discussed future injections as needed.  She will consider this for the future.    Call or return if worsening symptoms.    Follow Up   Return if symptoms worsen or fail to improve.  Patient was given instructions and counseling regarding her condition or for health maintenance advice. Please see specific information pulled into the AVS if appropriate.

## 2021-12-20 RX ORDER — DICLOFENAC SODIUM 75 MG/1
TABLET, DELAYED RELEASE ORAL
Qty: 90 TABLET | Refills: 0 | Status: ON HOLD | OUTPATIENT
Start: 2021-12-20 | End: 2022-02-02

## 2022-01-05 ENCOUNTER — OFFICE VISIT (OUTPATIENT)
Dept: FAMILY MEDICINE CLINIC | Facility: CLINIC | Age: 52
End: 2022-01-05

## 2022-01-05 VITALS
SYSTOLIC BLOOD PRESSURE: 116 MMHG | TEMPERATURE: 98 F | HEIGHT: 63 IN | WEIGHT: 152.8 LBS | OXYGEN SATURATION: 98 % | BODY MASS INDEX: 27.07 KG/M2 | RESPIRATION RATE: 18 BRPM | DIASTOLIC BLOOD PRESSURE: 74 MMHG | HEART RATE: 75 BPM

## 2022-01-05 DIAGNOSIS — L90.5 HEALING SCAR: Primary | ICD-10-CM

## 2022-01-05 PROCEDURE — 99213 OFFICE O/P EST LOW 20 MIN: CPT | Performed by: NURSE PRACTITIONER

## 2022-01-05 NOTE — PROGRESS NOTES
"Chief Complaint  Post-op Problem (incision - has a stich)    Subjective        Carmen Vidal presents to [facility]  Incision:  Having issues with getting extension of elbow. Having a pulling sensation and still can't completely extend due to scar in elbow and arm.  Has pulling sensation.        Past History:    Medical History: has a past medical history of Allergy, Anemia, Arthritis, Hemorrhoids, and Night sweats.     Surgical History: has a past surgical history that includes Breast Augmentation (2016); Tubal ligation (1997); and Skin cancer excision.     Family History: family history includes Breast cancer in her maternal grandmother; Cervical cancer in her maternal grandmother; Diabetes in her maternal grandfather.     Social History: reports that she has quit smoking. Her smoking use included cigarettes. She has a 20.00 pack-year smoking history. She has never used smokeless tobacco. She reports current alcohol use. She reports that she does not use drugs.    Allergies: Patient has no known allergies.          Current Outpatient Medications:   •  diclofenac (VOLTAREN) 75 MG EC tablet, TAKE ONE TABLET BY MOUTH DAILY, Disp: 90 tablet, Rfl: 0  •  polyethylene glycol (MIRALAX) 17 g packet, Take as directed.  Instructions given in office.  Dispense: 238 g bottle, Disp: 238 packet, Rfl: 0    There are no discontinued medications.      Review of Systems   Constitutional: Negative for fever.   Respiratory: Negative for cough and shortness of breath.    Cardiovascular: Negative for chest pain, palpitations and leg swelling.   Neurological: Negative for dizziness, weakness, numbness and headache.        Objective         Vitals:    01/05/22 0938   BP: 116/74   BP Location: Right arm   Patient Position: Sitting   Cuff Size: Adult   Pulse: 75   Resp: 18   Temp: 98 °F (36.7 °C)   TempSrc: Infrared   SpO2: 98%   Weight: 69.3 kg (152 lb 12.8 oz)   Height: 160 cm (63\")     Body mass index " is 27.07 kg/m².         Physical Exam  Vitals reviewed.   Constitutional:       Appearance: Normal appearance. She is well-developed.   HENT:      Head: Normocephalic and atraumatic.      Mouth/Throat:      Pharynx: No oropharyngeal exudate.   Eyes:      Conjunctiva/sclera: Conjunctivae normal.      Pupils: Pupils are equal, round, and reactive to light.   Cardiovascular:      Rate and Rhythm: Normal rate and regular rhythm.      Heart sounds: No murmur heard.  No friction rub. No gallop.    Pulmonary:      Effort: Pulmonary effort is normal.      Breath sounds: Normal breath sounds. No wheezing or rhonchi.   Skin:     General: Skin is warm and dry.             Comments: Scar noted into inner arm. And AC space.   Neurological:      Mental Status: She is alert and oriented to person, place, and time.   Psychiatric:         Mood and Affect: Mood and affect normal.         Behavior: Behavior normal.         Thought Content: Thought content normal.         Judgment: Judgment normal.             Result Review :               Assessment and Plan     Diagnoses and all orders for this visit:    1. Healing scar (Primary)  Comments:  gave exercise to help with extension. and may take some months for sutrue to dissolve.              Follow Up     Return for Next scheduled follow up.    Patient was given instructions and counseling regarding her condition or for health maintenance advice. Please see specific information pulled into the AVS if appropriate.

## 2022-02-01 NOTE — H&P
Chief Complaint: No chief complaint on file.    Subjective      Colonoscopy consultation         History of Present Illness  Carmen Vidal is a 52 y.o. female presents to [facility] for colonoscopy consultation.    Patient presents today on referral from Luther Milton for colonoscopy consultation.    Patient denies any abdominal pain, change in bowel habit, rectal bleeding.    Denies any family history of colorectal cancer.    No previous colonoscopy.    Objective     Past Medical History:   Diagnosis Date   • Allergy    • Anemia    • Arthritis    • Hemorrhoids    • Night sweats        Past Surgical History:   Procedure Laterality Date   • BREAST AUGMENTATION  2016   • SKIN CANCER EXCISION     • TUBAL ABDOMINAL LIGATION  1997       No current facility-administered medications for this encounter.    Current Outpatient Medications:   •  diclofenac (VOLTAREN) 75 MG EC tablet, TAKE ONE TABLET BY MOUTH DAILY (Patient taking differently: Take 75 mg by mouth Every Night.), Disp: 90 tablet, Rfl: 0  •  polyethylene glycol (MIRALAX) 17 g packet, Take as directed.  Instructions given in office.  Dispense: 238 g bottle, Disp: 238 packet, Rfl: 0    No Known Allergies     Family History   Problem Relation Age of Onset   • Breast cancer Maternal Grandmother    • Cervical cancer Maternal Grandmother    • Diabetes Maternal Grandfather    • Malig Hyperthermia Neg Hx        Social History     Socioeconomic History   • Marital status:    Tobacco Use   • Smoking status: Former Smoker     Packs/day: 1.00     Years: 20.00     Pack years: 20.00     Types: Cigarettes   • Smokeless tobacco: Never Used   • Tobacco comment: quit-    Vaping Use   • Vaping Use: Never used   Substance and Sexual Activity   • Alcohol use: Yes     Comment: occasionally   • Drug use: Never   • Sexual activity: Defer         Vital Signs:   There were no vitals taken for this visit.     Physical Exam  Constitutional:        Appearance: Normal appearance.   HENT:      Head: Normocephalic.   Cardiovascular:      Rate and Rhythm: Normal rate.   Pulmonary:      Effort: Pulmonary effort is normal.   Abdominal:      General: Abdomen is flat.      Palpations: Abdomen is soft.   Skin:     General: Skin is warm and dry.   Neurological:      General: No focal deficit present.      Mental Status: She is alert and oriented to person, place, and time.   Psychiatric:         Mood and Affect: Mood normal.         Thought Content: Thought content normal.        Assessment   Screening colonoscopy    Plan  Colonoscopy    Risks and benefits discussed    Flash Carty M.D.  02/01/22    Electronically signed by Flash Carty MD, 02/01/22, 3:12 PM EST.

## 2022-02-02 ENCOUNTER — HOSPITAL ENCOUNTER (OUTPATIENT)
Facility: HOSPITAL | Age: 52
Setting detail: HOSPITAL OUTPATIENT SURGERY
Discharge: HOME | End: 2022-02-02
Attending: SURGERY | Admitting: SURGERY

## 2022-02-02 ENCOUNTER — ANESTHESIA (OUTPATIENT)
Dept: GASTROENTEROLOGY | Facility: HOSPITAL | Age: 52
End: 2022-02-02

## 2022-02-02 ENCOUNTER — ANESTHESIA EVENT (OUTPATIENT)
Dept: GASTROENTEROLOGY | Facility: HOSPITAL | Age: 52
End: 2022-02-02

## 2022-02-02 VITALS
TEMPERATURE: 97 F | BODY MASS INDEX: 26.95 KG/M2 | HEART RATE: 67 BPM | DIASTOLIC BLOOD PRESSURE: 78 MMHG | WEIGHT: 152.12 LBS | SYSTOLIC BLOOD PRESSURE: 112 MMHG | OXYGEN SATURATION: 100 % | RESPIRATION RATE: 16 BRPM

## 2022-02-02 DIAGNOSIS — Z12.11 SCREENING FOR MALIGNANT NEOPLASM OF COLON: ICD-10-CM

## 2022-02-02 PROCEDURE — 25010000002 PROPOFOL 10 MG/ML EMULSION: Performed by: NURSE ANESTHETIST, CERTIFIED REGISTERED

## 2022-02-02 RX ORDER — SODIUM CHLORIDE, SODIUM LACTATE, POTASSIUM CHLORIDE, CALCIUM CHLORIDE 600; 310; 30; 20 MG/100ML; MG/100ML; MG/100ML; MG/100ML
30 INJECTION, SOLUTION INTRAVENOUS CONTINUOUS
Status: DISCONTINUED | OUTPATIENT
Start: 2022-02-02 | End: 2022-02-02 | Stop reason: HOSPADM

## 2022-02-02 RX ORDER — SODIUM CHLORIDE 0.9 % (FLUSH) 0.9 %
10 SYRINGE (ML) INJECTION AS NEEDED
Status: DISCONTINUED | OUTPATIENT
Start: 2022-02-02 | End: 2022-02-02 | Stop reason: HOSPADM

## 2022-02-02 RX ORDER — SODIUM CHLORIDE 0.9 % (FLUSH) 0.9 %
3 SYRINGE (ML) INJECTION EVERY 12 HOURS SCHEDULED
Status: DISCONTINUED | OUTPATIENT
Start: 2022-02-02 | End: 2022-02-02 | Stop reason: HOSPADM

## 2022-02-02 RX ORDER — LIDOCAINE HYDROCHLORIDE 20 MG/ML
INJECTION, SOLUTION INFILTRATION; PERINEURAL AS NEEDED
Status: DISCONTINUED | OUTPATIENT
Start: 2022-02-02 | End: 2022-02-02 | Stop reason: SURG

## 2022-02-02 RX ORDER — PROPOFOL 10 MG/ML
VIAL (ML) INTRAVENOUS AS NEEDED
Status: DISCONTINUED | OUTPATIENT
Start: 2022-02-02 | End: 2022-02-02 | Stop reason: SURG

## 2022-02-02 RX ADMIN — PROPOFOL 200 MCG/KG/MIN: 10 INJECTION, EMULSION INTRAVENOUS at 07:20

## 2022-02-02 RX ADMIN — PROPOFOL 50 MG: 10 INJECTION, EMULSION INTRAVENOUS at 07:20

## 2022-02-02 RX ADMIN — SODIUM CHLORIDE, POTASSIUM CHLORIDE, SODIUM LACTATE AND CALCIUM CHLORIDE 30 ML/HR: 600; 310; 30; 20 INJECTION, SOLUTION INTRAVENOUS at 06:50

## 2022-02-02 RX ADMIN — LIDOCAINE HYDROCHLORIDE 100 MG: 20 INJECTION, SOLUTION INFILTRATION; PERINEURAL at 07:20

## 2022-02-02 NOTE — DISCHARGE INSTRUCTIONS
Dr. Carty's Instructions       • Next colonoscopy in 10 years.    • Follow-up with your primary care clinician at your next scheduled appointment time.

## 2022-02-02 NOTE — ANESTHESIA POSTPROCEDURE EVALUATION
Patient: Carmen Vidal    Procedure Summary     Date: 02/02/22 Room / Location: [facility] ENDOSCOPY 1 / [facility] ENDOSCOPY    Anesthesia Start: 0718 Anesthesia Stop: 0746    Procedure: COLONOSCOPY with possible biopies. (N/A ) Diagnosis:       Screening for malignant neoplasm of colon      (Screening for malignant neoplasm of colon [Z12.11])    Surgeons: Flash Carty MD Provider: Matt Fishman MD    Anesthesia Type: general ASA Status: 2          Anesthesia Type: general    Vitals  Vitals Value Taken Time   BP 91/51 02/02/22 0749   Temp 36.1 °C (97 °F) 02/02/22 0745   Pulse 61 02/02/22 0752   Resp 12 02/02/22 0745   SpO2 99 % 02/02/22 0752   Vitals shown include unvalidated device data.        Post Anesthesia Care and Evaluation    Patient location during evaluation: bedside  Patient participation: complete - patient participated  Level of consciousness: awake  Pain management: adequate  Airway patency: patent  Anesthetic complications: No anesthetic complications  PONV Status: none  Cardiovascular status: acceptable and stable  Respiratory status: acceptable  Hydration status: acceptable    Comments: An Anesthesiologist personally participated in the most demanding procedures (including induction and emergence if applicable) in the anesthesia plan, monitored the course of anesthesia administration at frequent intervals and remained physically present and available for immediate diagnosis and treatment of emergencies.

## 2022-02-02 NOTE — ANESTHESIA PREPROCEDURE EVALUATION
Anesthesia Evaluation     Patient summary reviewed and Nursing notes reviewed   no history of anesthetic complications:  NPO Solid Status: > 8 hours  NPO Liquid Status: > 2 hours           Airway   Mallampati: II  TM distance: >3 FB  Neck ROM: full  No difficulty expected  Dental      Pulmonary - negative pulmonary ROS and normal exam    breath sounds clear to auscultation  Cardiovascular - negative cardio ROS and normal exam  Exercise tolerance: good (4-7 METS)    Rhythm: regular  Rate: normal        Neuro/Psych- negative ROS  GI/Hepatic/Renal/Endo - negative ROS     Musculoskeletal     Abdominal    Substance History - negative use     OB/GYN negative ob/gyn ROS         Other   arthritis,                      Anesthesia Plan    ASA 2     general   total IV anesthesia    Anesthetic plan, all risks, benefits, and alternatives have been provided, discussed and informed consent has been obtained with: patient.        CODE STATUS:

## 2022-03-18 RX ORDER — DICLOFENAC SODIUM 75 MG/1
TABLET, DELAYED RELEASE ORAL
Qty: 90 TABLET | Refills: 1 | Status: SHIPPED | OUTPATIENT
Start: 2022-03-18 | End: 2022-09-09

## 2022-03-18 NOTE — TELEPHONE ENCOUNTER
Please advise medication   Last visit:1/5/2022  Next visit:na    Medication was discontinued by PCP on 12/20/21

## 2022-05-29 ENCOUNTER — PATIENT MESSAGE (OUTPATIENT)
Dept: FAMILY MEDICINE CLINIC | Facility: CLINIC | Age: 52
End: 2022-05-29

## 2022-05-29 DIAGNOSIS — M54.50 LOW BACK PAIN, UNSPECIFIED BACK PAIN LATERALITY, UNSPECIFIED CHRONICITY, UNSPECIFIED WHETHER SCIATICA PRESENT: Primary | ICD-10-CM

## 2022-06-01 NOTE — TELEPHONE ENCOUNTER
From: Carmen Vidal  To: PARISH Jordan  Sent: 5/29/2022 5:20 PM EDT  Subject: Chiropractic referral    Is it possible to get a referral to visit a chiropractor? I feel like it might be beneficial, I'm still having some issues with low back/sciatica.   Thanks, Carmen

## 2022-06-06 ENCOUNTER — OFFICE VISIT (OUTPATIENT)
Dept: FAMILY MEDICINE CLINIC | Facility: CLINIC | Age: 52
End: 2022-06-06

## 2022-06-06 VITALS
HEIGHT: 63 IN | DIASTOLIC BLOOD PRESSURE: 76 MMHG | BODY MASS INDEX: 27.71 KG/M2 | SYSTOLIC BLOOD PRESSURE: 124 MMHG | OXYGEN SATURATION: 98 % | RESPIRATION RATE: 16 BRPM | TEMPERATURE: 98.4 F | WEIGHT: 156.4 LBS | HEART RATE: 60 BPM

## 2022-06-06 DIAGNOSIS — L24.9 IRRITANT CONTACT DERMATITIS, UNSPECIFIED TRIGGER: Primary | ICD-10-CM

## 2022-06-06 PROCEDURE — 99213 OFFICE O/P EST LOW 20 MIN: CPT | Performed by: NURSE PRACTITIONER

## 2022-06-06 RX ORDER — TRIAMCINOLONE ACETONIDE 40 MG/ML
40 INJECTION, SUSPENSION INTRA-ARTICULAR; INTRAMUSCULAR ONCE
Status: DISCONTINUED | OUTPATIENT
Start: 2022-06-06 | End: 2022-06-06

## 2022-06-06 RX ORDER — METHYLPREDNISOLONE 4 MG/1
TABLET ORAL
Qty: 21 TABLET | Refills: 0 | Status: SHIPPED | OUTPATIENT
Start: 2022-06-06 | End: 2022-06-13 | Stop reason: SDUPTHER

## 2022-06-06 NOTE — PROGRESS NOTES
Chief Complaint  Rash    Subjective        Carmen Vidal presents to [facility]  Was out in the yard and was pulling weeds but also had farmers spraying at the end of the road.  Only has rash where skin exposed.    Rash  Pertinent negatives include no cough, fever or shortness of breath.         Past History:    Medical History: has a past medical history of Allergy, Anemia, Arthritis, Hemorrhoids, and Night sweats.     Surgical History: has a past surgical history that includes Breast Augmentation (2016); Tubal ligation (1997); Skin cancer excision; and Colonoscopy (N/A, 2/2/2022).     Family History: family history includes Breast cancer in her maternal grandmother; Cervical cancer in her maternal grandmother; Diabetes in her maternal grandfather.     Social History: reports that she has quit smoking. Her smoking use included cigarettes. She has a 20.00 pack-year smoking history. She has never used smokeless tobacco. She reports current alcohol use. She reports that she does not use drugs.    Allergies: Patient has no known allergies.          Current Outpatient Medications:   •  diclofenac (VOLTAREN) 75 MG EC tablet, TAKE ONE TABLET BY MOUTH DAILY, Disp: 90 tablet, Rfl: 1  •  methylPREDNISolone (MEDROL) 4 MG dose pack, Take as directed on package instructions., Disp: 21 tablet, Rfl: 0  •  polyethylene glycol (MIRALAX) 17 g packet, Take as directed.  Instructions given in office.  Dispense: 238 g bottle, Disp: 238 packet, Rfl: 0  No current facility-administered medications for this visit.    Medications Discontinued During This Encounter   Medication Reason   • triamcinolone acetonide (KENALOG-40) injection 40 mg          Review of Systems   Constitutional: Negative for fever.   Respiratory: Negative for cough and shortness of breath.    Cardiovascular: Negative for chest pain, palpitations and leg swelling.   Skin: Positive for rash.   Neurological: Negative for dizziness,  "weakness, numbness and headache.        Objective         Vitals:    06/06/22 1357   BP: 124/76   BP Location: Right arm   Patient Position: Sitting   Cuff Size: Adult   Pulse: 60   Resp: 16   Temp: 98.4 °F (36.9 °C)   TempSrc: Infrared   SpO2: 98%   Weight: 70.9 kg (156 lb 6.4 oz)   Height: 160 cm (62.99\")     Body mass index is 27.71 kg/m².         Physical Exam  Vitals reviewed.   Constitutional:       Appearance: Normal appearance. She is well-developed.   HENT:      Head: Normocephalic and atraumatic.      Mouth/Throat:      Pharynx: No oropharyngeal exudate.   Eyes:      Conjunctiva/sclera: Conjunctivae normal.      Pupils: Pupils are equal, round, and reactive to light.   Cardiovascular:      Rate and Rhythm: Normal rate and regular rhythm.      Heart sounds: Normal heart sounds. No murmur heard.    No friction rub. No gallop.   Pulmonary:      Effort: Pulmonary effort is normal.      Breath sounds: Normal breath sounds. No wheezing or rhonchi.   Skin:     General: Skin is warm and dry.      Findings: Rash present. Rash is urticarial and vesicular.          Neurological:      Mental Status: She is alert and oriented to person, place, and time.   Psychiatric:         Mood and Affect: Mood and affect normal.         Behavior: Behavior normal.         Thought Content: Thought content normal.         Judgment: Judgment normal.             Result Review :               Assessment and Plan     Diagnoses and all orders for this visit:    1. Irritant contact dermatitis, unspecified trigger (Primary)  -     Discontinue: triamcinolone acetonide (KENALOG-40) injection 40 mg  -     methylPREDNISolone (MEDROL) 4 MG dose pack; Take as directed on package instructions.  Dispense: 21 tablet; Refill: 0              Follow Up     Return for Next scheduled follow up.    Patient was given instructions and counseling regarding her condition or for health maintenance advice. Please see specific information pulled into the AVS if " appropriate.

## 2022-06-13 ENCOUNTER — PATIENT MESSAGE (OUTPATIENT)
Dept: FAMILY MEDICINE CLINIC | Facility: CLINIC | Age: 52
End: 2022-06-13

## 2022-06-13 DIAGNOSIS — L24.9 IRRITANT CONTACT DERMATITIS, UNSPECIFIED TRIGGER: ICD-10-CM

## 2022-06-13 RX ORDER — METHYLPREDNISOLONE 4 MG/1
TABLET ORAL
Qty: 21 TABLET | Refills: 0 | Status: SHIPPED | OUTPATIENT
Start: 2022-06-13

## 2022-06-13 NOTE — TELEPHONE ENCOUNTER
From: Carmen Vidal  To: PARISH Jordan  Sent: 6/13/2022 7:41 AM EDT  Subject: Joce poison ivy pictures    He is not clearing up. The ear is better. I even gave him the last 3 days of my medication. See his pictures. And it was definitely poison ivy, I found the culprit.     Thanks, Carmen

## 2022-09-09 RX ORDER — DICLOFENAC SODIUM 75 MG/1
TABLET, DELAYED RELEASE ORAL
Qty: 90 TABLET | Refills: 1 | Status: SHIPPED | OUTPATIENT
Start: 2022-09-09 | End: 2022-12-20 | Stop reason: SDUPTHER

## 2022-11-28 ENCOUNTER — PATIENT MESSAGE (OUTPATIENT)
Dept: FAMILY MEDICINE CLINIC | Facility: CLINIC | Age: 52
End: 2022-11-28

## 2022-11-28 NOTE — TELEPHONE ENCOUNTER
From: Carmen Vidal  To: PARISH Jordan  Sent: 11/28/2022 7:16 AM EST  Subject: Alejandro Vidal Med Refill    Good morning,  I hope you all had a happy Thanksgiving. Alejandro will need a refill of his quillivant by the end of this week.   Thank you,  Carmen

## 2022-12-20 RX ORDER — DICLOFENAC SODIUM 75 MG/1
75 TABLET, DELAYED RELEASE ORAL DAILY
Qty: 90 TABLET | Refills: 1 | Status: SHIPPED | OUTPATIENT
Start: 2022-12-20 | End: 2023-03-06 | Stop reason: SDUPTHER

## 2023-03-06 RX ORDER — DICLOFENAC SODIUM 75 MG/1
75 TABLET, DELAYED RELEASE ORAL DAILY
Qty: 90 TABLET | Refills: 1 | Status: SHIPPED | OUTPATIENT
Start: 2023-03-06

## 2023-05-30 RX ORDER — DICLOFENAC SODIUM 75 MG/1
75 TABLET, DELAYED RELEASE ORAL DAILY
Qty: 90 TABLET | Refills: 1 | Status: SHIPPED | OUTPATIENT
Start: 2023-05-30

## 2023-08-03 DIAGNOSIS — L98.9 SKIN LESION: Primary | ICD-10-CM

## 2023-09-21 ENCOUNTER — OFFICE VISIT (OUTPATIENT)
Dept: FAMILY MEDICINE CLINIC | Facility: CLINIC | Age: 53
End: 2023-09-21
Payer: OTHER GOVERNMENT

## 2023-09-21 VITALS
SYSTOLIC BLOOD PRESSURE: 100 MMHG | RESPIRATION RATE: 18 BRPM | HEIGHT: 63 IN | OXYGEN SATURATION: 98 % | TEMPERATURE: 98.4 F | HEART RATE: 88 BPM | DIASTOLIC BLOOD PRESSURE: 60 MMHG | WEIGHT: 152.8 LBS | BODY MASS INDEX: 27.07 KG/M2

## 2023-09-21 DIAGNOSIS — Z12.4 SCREENING FOR CERVICAL CANCER: ICD-10-CM

## 2023-09-21 DIAGNOSIS — Z12.39 ENCOUNTER FOR SCREENING FOR MALIGNANT NEOPLASM OF BREAST, UNSPECIFIED SCREENING MODALITY: Primary | ICD-10-CM

## 2023-09-21 DIAGNOSIS — Z12.31 ENCOUNTER FOR SCREENING MAMMOGRAM FOR MALIGNANT NEOPLASM OF BREAST: ICD-10-CM

## 2023-09-21 PROCEDURE — G0123 SCREEN CERV/VAG THIN LAYER: HCPCS | Performed by: NURSE PRACTITIONER

## 2023-09-21 PROCEDURE — 87624 HPV HI-RISK TYP POOLED RSLT: CPT | Performed by: NURSE PRACTITIONER

## 2023-09-21 NOTE — PROGRESS NOTES
Chief Complaint  post menopause (Bleeding couple days during July and August )    Subjective        Carmen Vidal presents to [facility]  Female  Problem  The patient's primary symptoms include vaginal discharge. This is a new problem. The current episode started more than 1 year ago. The problem occurs rarely. The problem has been unchanged. The patient is experiencing no pain. She is not pregnant. Pertinent negatives include no fever. She has not been passing clots. She has not been passing tissue. No, her partner does not have an STD. She uses tubal ligation for contraception. She is postmenopausal.     The following portions of the patient's history were personally reviewed and updated as appropriate: allergies, current medications, past medical history, past surgical history, past family history, and past social history.     Body mass index is 27.07 kg/m².   Is overweight and givin diet education..      Past History:    Medical History: has a past medical history of Allergy, Anemia, Arthritis, Hemorrhoids, and Night sweats.     Surgical History: has a past surgical history that includes Breast Augmentation (2016); Tubal ligation (1997); Skin cancer excision; and Colonoscopy (N/A, 2/2/2022).     Family History: family history includes Breast cancer in her maternal grandmother; Cervical cancer in her maternal grandmother; Diabetes in her maternal grandfather.     Social History: reports that she has quit smoking. Her smoking use included cigarettes. She has a 20.00 pack-year smoking history. She has never used smokeless tobacco. She reports current alcohol use. She reports that she does not use drugs.    Allergies: Patient has no known allergies.          Current Outpatient Medications:     DHEA 10 MG capsule, Take  by mouth., Disp: , Rfl:     diclofenac (VOLTAREN) 75 MG EC tablet, Take 1 tablet by mouth Daily., Disp: 90 tablet, Rfl: 1    ESTRADIOL ACETATE PO, Take 1.5 mg by  "mouth., Disp: , Rfl:     THYROID PO, Take  by mouth. 1.75 grain  (105mg), Disp: , Rfl:     Unable to find, 1 each 1 (One) Time. Med Name: Testosterone 40 mg cream, Disp: , Rfl:     vitamin D3 125 MCG (5000 UT) capsule capsule, Take 1 capsule by mouth Daily., Disp: , Rfl:     methylPREDNISolone (MEDROL) 4 MG dose pack, Take as directed on package instructions. (Patient not taking: Reported on 9/21/2023), Disp: 21 tablet, Rfl: 0    polyethylene glycol (MIRALAX) 17 g packet, Take as directed.  Instructions given in office.  Dispense: 238 g bottle (Patient not taking: Reported on 9/21/2023), Disp: 238 packet, Rfl: 0    There are no discontinued medications.      Review of Systems   Constitutional:  Negative for fever.   Respiratory:  Negative for cough and shortness of breath.    Cardiovascular:  Negative for chest pain, palpitations and leg swelling.   Genitourinary:  Positive for vaginal discharge.   Neurological:  Negative for dizziness, weakness, numbness and headache.      Objective         Vitals:    09/21/23 1307 09/21/23 1338   BP: 100/60    Pulse: 88    Resp: 18    Temp: 98.4 °F (36.9 °C)    SpO2: 98%    Weight: 69.3 kg (152 lb 12.8 oz)    Height: 63 cm (24.8\") 160 cm (63\")     Body mass index is 27.07 kg/m².         Physical Exam  Vitals reviewed.   Constitutional:       Appearance: Normal appearance. She is well-developed.   HENT:      Head: Normocephalic and atraumatic.      Mouth/Throat:      Pharynx: No oropharyngeal exudate.   Eyes:      Conjunctiva/sclera: Conjunctivae normal.      Pupils: Pupils are equal, round, and reactive to light.   Cardiovascular:      Rate and Rhythm: Normal rate and regular rhythm.      Heart sounds: Normal heart sounds. No murmur heard.    No friction rub. No gallop.   Pulmonary:      Effort: Pulmonary effort is normal.      Breath sounds: Normal breath sounds. No wheezing or rhonchi.   Skin:     General: Skin is warm and dry.   Neurological:      Mental Status: She is alert " and oriented to person, place, and time.   Psychiatric:         Mood and Affect: Mood and affect normal.         Behavior: Behavior normal.         Thought Content: Thought content normal.         Judgment: Judgment normal.           Result Review :               Assessment and Plan     Diagnoses and all orders for this visit:    1. Encounter for screening for malignant neoplasm of breast, unspecified screening modality (Primary)  -     Mammo Screening Digital Tomosynthesis Bilateral With CAD; Future    2. Encounter for screening mammogram for malignant neoplasm of breast  -     Cancel: US Non-ob Transvaginal; Future  -     US Non-ob Transvaginal; Future    3. Screening for cervical cancer  -     IgP, Aptima HPV; Future      Will go to OB if all is normal        Follow Up     Return for Next scheduled follow up.    Patient was given instructions and counseling regarding her condition or for health maintenance advice. Please see specific information pulled into the AVS if appropriate.       Answers submitted by the patient for this visit:  Primary Reason for Visit (Submitted on 9/19/2023)  What is the primary reason for your visit?: Vaginal Discharge/Irritation

## 2023-09-25 LAB
CYTOLOGIST CVX/VAG CYTO: NORMAL
CYTOLOGY CVX/VAG DOC CYTO: NORMAL
CYTOLOGY CVX/VAG DOC THIN PREP: NORMAL
DX ICD CODE: NORMAL
HIV 1 & 2 AB SER-IMP: NORMAL
HPV I/H RISK 4 DNA CVX QL PROBE+SIG AMP: NEGATIVE
OTHER STN SPEC: NORMAL
STAT OF ADQ CVX/VAG CYTO-IMP: NORMAL

## 2023-10-17 ENCOUNTER — PATIENT MESSAGE (OUTPATIENT)
Dept: FAMILY MEDICINE CLINIC | Facility: CLINIC | Age: 53
End: 2023-10-17
Payer: OTHER GOVERNMENT

## 2023-10-17 DIAGNOSIS — B00.1 COLD SORE: Primary | ICD-10-CM

## 2023-10-18 RX ORDER — VALACYCLOVIR HYDROCHLORIDE 1 G/1
1000 TABLET, FILM COATED ORAL 2 TIMES DAILY
Qty: 6 TABLET | Refills: 2 | Status: SHIPPED | OUTPATIENT
Start: 2023-10-18

## 2023-10-18 NOTE — TELEPHONE ENCOUNTER
From: Carmen Vidal  To: Luther Rodriguez  Sent: 10/17/2023 1:21 PM EDT  Subject: Cold sore medicine    I have a cold sore starting am wondering if you can prescribe something? I think you gave me something a couple years ago when I got one.  I can make an appointment if needed.   Thank you,  Carmen

## 2023-11-13 RX ORDER — DICLOFENAC SODIUM 75 MG/1
75 TABLET, DELAYED RELEASE ORAL DAILY
Qty: 90 TABLET | Refills: 1 | Status: SHIPPED | OUTPATIENT
Start: 2023-11-13

## 2024-01-05 ENCOUNTER — HOSPITAL ENCOUNTER (OUTPATIENT)
Dept: MAMMOGRAPHY | Facility: HOSPITAL | Age: 54
Discharge: HOME | End: 2024-01-05
Admitting: NURSE PRACTITIONER
Payer: OTHER GOVERNMENT

## 2024-01-05 DIAGNOSIS — Z12.39 ENCOUNTER FOR SCREENING FOR MALIGNANT NEOPLASM OF BREAST, UNSPECIFIED SCREENING MODALITY: ICD-10-CM

## 2024-01-05 PROCEDURE — 77067 SCR MAMMO BI INCL CAD: CPT

## 2024-01-05 PROCEDURE — 77063 BREAST TOMOSYNTHESIS BI: CPT

## 2024-04-15 ENCOUNTER — OFFICE VISIT (OUTPATIENT)
Dept: FAMILY MEDICINE CLINIC | Facility: CLINIC | Age: 54
End: 2024-04-15
Payer: OTHER GOVERNMENT

## 2024-04-15 VITALS
BODY MASS INDEX: 28.24 KG/M2 | RESPIRATION RATE: 16 BRPM | HEIGHT: 63 IN | TEMPERATURE: 97.7 F | SYSTOLIC BLOOD PRESSURE: 106 MMHG | HEART RATE: 80 BPM | OXYGEN SATURATION: 98 % | DIASTOLIC BLOOD PRESSURE: 74 MMHG | WEIGHT: 159.4 LBS

## 2024-04-15 DIAGNOSIS — B00.1 COLD SORE: ICD-10-CM

## 2024-04-15 PROCEDURE — 99213 OFFICE O/P EST LOW 20 MIN: CPT | Performed by: NURSE PRACTITIONER

## 2024-04-15 RX ORDER — VALACYCLOVIR HYDROCHLORIDE 1 G/1
1000 TABLET, FILM COATED ORAL 2 TIMES DAILY
Qty: 6 TABLET | Refills: 5 | Status: SHIPPED | OUTPATIENT
Start: 2024-04-15

## 2024-04-15 NOTE — PROGRESS NOTES
Answers submitted by the patient for this visit:  Other (Submitted on 4/9/2024)  Please describe your symptoms.: Headache  Have you had these symptoms before?: Yes  How long have you been having these symptoms?: Greater than 2 weeks  Please list any medications you are currently taking for this condition.: Otc med  Please describe any probable cause for these symptoms. : Tension in my jaw  Primary Reason for Visit (Submitted on 4/9/2024)  What is the primary reason for your visit?: Other  Chief Complaint  Jaw Pain (Gets headache from it, has taken ibu and a roll on pain cream), Stress, and Headache (Thinks she had a migraine a month ago, nausea, sensitive to light, Goody power worked)    Subjective        Carmen Vidal presents to [facility]  History of Present Illness  Jaw Pain  gets headache and has taken ibuprofen and doesn't help. As well roll on cream.  It is located under jaw not at the mandible joint and under ears.    CBD roll on helps at some.  Has had since 2000.  No pain with opening and closing jaw with headaches and tension. If continues through day with stressors will have a headache.  Acupuncture didn't help.  Has cracked 2 teeth.  Not sure how.    Has acupuncture on shoulder in the past and helped.  Has tried muscle relaxers but other than making her sleep didn't help.    Stress and headache: had a migraine a month ago and goodys powder help.  Jaw Pain  Associated symptoms include headaches. Pertinent negatives include no chest pain, coughing, fever, numbness or weakness.   Headache      The following portions of the patient's history were personally reviewed and updated as appropriate: allergies, current medications, past medical history, past surgical history, past family history, and past social history.     Body mass index is 28.24 kg/m².    BMI is >= 25 and <30. (Overweight) The following options were offered after discussion;: weight loss educational  material (shared in after visit summary)      Past History:    Medical History: has a past medical history of Allergy, Anemia, Arthritis, Hemorrhoids, and Night sweats.     Surgical History: has a past surgical history that includes Breast Augmentation (2016); Tubal ligation (1997); Skin cancer excision; and Colonoscopy (N/A, 2/2/2022).     Family History: family history includes Breast cancer in her maternal grandmother; Cervical cancer in her maternal grandmother; Diabetes in her maternal grandfather.     Social History: reports that she has quit smoking. Her smoking use included cigarettes. She has a 20 pack-year smoking history. She has never used smokeless tobacco. She reports current alcohol use. She reports that she does not use drugs.    Allergies: Patient has no known allergies.          Current Outpatient Medications:     DHEA 10 MG capsule, Take  by mouth., Disp: , Rfl:     diclofenac (VOLTAREN) 75 MG EC tablet, TAKE 1 TABLET BY MOUTH DAILY, Disp: 90 tablet, Rfl: 1    ESTRADIOL ACETATE PO, Take 1.5 mg by mouth., Disp: , Rfl:     metroNIDAZOLE (METROCREAM) 0.75 % cream, Apply 1 Application topically to the appropriate area as directed 2 (Two) Times a Day., Disp: , Rfl:     polyethylene glycol (MIRALAX) 17 g packet, Take as directed.  Instructions given in office.  Dispense: 238 g bottle, Disp: 238 packet, Rfl: 0    THYROID PO, Take  by mouth. 1.75 grain  (105mg), Disp: , Rfl:     Unable to find, 1 each 1 (One) Time. Med Name: Testosterone 40 mg cream, Disp: , Rfl:     valACYclovir (Valtrex) 1000 MG tablet, Take 1 tablet by mouth 2 (Two) Times a Day., Disp: 6 tablet, Rfl: 5    vitamin D3 125 MCG (5000 UT) capsule capsule, Take 1 capsule by mouth Daily., Disp: , Rfl:     Medications Discontinued During This Encounter   Medication Reason    methylPREDNISolone (MEDROL) 4 MG dose pack *Therapy completed    valACYclovir (Valtrex) 1000 MG tablet Reorder         Review of Systems   Constitutional:  Negative for  "fever.   Respiratory:  Negative for cough and shortness of breath.    Cardiovascular:  Negative for chest pain, palpitations and leg swelling.   Neurological:  Negative for dizziness, weakness, numbness and headache.        Objective         Vitals:    04/15/24 0847   BP: 106/74   BP Location: Right arm   Patient Position: Sitting   Cuff Size: Adult   Pulse: 80   Resp: 16   Temp: 97.7 °F (36.5 °C)   TempSrc: Temporal   SpO2: 98%   Weight: 72.3 kg (159 lb 6.4 oz)   Height: 160 cm (62.99\")     Body mass index is 28.24 kg/m².         Physical Exam  Vitals reviewed.   Constitutional:       Appearance: Normal appearance. She is well-developed.   HENT:      Head: Normocephalic and atraumatic.      Right Ear: Tympanic membrane and external ear normal.      Left Ear: Tympanic membrane and external ear normal.      Nose: No rhinorrhea.      Mouth/Throat:      Pharynx: No oropharyngeal exudate or posterior oropharyngeal erythema.   Eyes:      Conjunctiva/sclera: Conjunctivae normal.      Pupils: Pupils are equal, round, and reactive to light.   Cardiovascular:      Rate and Rhythm: Normal rate and regular rhythm.      Heart sounds: Normal heart sounds. No murmur heard.     No friction rub. No gallop.   Pulmonary:      Effort: Pulmonary effort is normal.      Breath sounds: Normal breath sounds. No wheezing or rhonchi.   Skin:     General: Skin is warm and dry.   Neurological:      Mental Status: She is alert and oriented to person, place, and time.   Psychiatric:         Mood and Affect: Mood and affect normal.         Behavior: Behavior normal.         Thought Content: Thought content normal.         Judgment: Judgment normal.             Result Review :  MRI 2021 showed no issues.               Assessment and Plan     Diagnoses and all orders for this visit:    1. Cold sore  -     valACYclovir (Valtrex) 1000 MG tablet; Take 1 tablet by mouth 2 (Two) Times a Day.  Dispense: 6 tablet; Refill: 5      Will try tonic water for " muscle pain and see if it helps.  Review of old mri shows no growths.        Follow Up     Return for Next scheduled follow up.    Patient was given instructions and counseling regarding her condition or for health maintenance advice. Please see specific information pulled into the AVS if appropriate.

## 2024-05-13 RX ORDER — DICLOFENAC SODIUM 75 MG/1
75 TABLET, DELAYED RELEASE ORAL DAILY
Qty: 90 TABLET | Refills: 1 | Status: SHIPPED | OUTPATIENT
Start: 2024-05-13

## 2024-08-07 RX ORDER — DICLOFENAC SODIUM 75 MG/1
75 TABLET, DELAYED RELEASE ORAL DAILY
Qty: 90 TABLET | Refills: 1 | Status: SHIPPED | OUTPATIENT
Start: 2024-08-07

## 2024-08-07 NOTE — TELEPHONE ENCOUNTER
UPCOMING APPTS  No upcoming appointments  LAST OFFICE VISIT - THIS DEPT  4/15/2024 Luther Rodriguez, [facility]

## 2024-10-09 DIAGNOSIS — Z12.31 ENCOUNTER FOR SCREENING MAMMOGRAM FOR MALIGNANT NEOPLASM OF BREAST: Primary | ICD-10-CM

## 2025-01-16 DIAGNOSIS — B00.1 COLD SORE: ICD-10-CM

## 2025-01-16 RX ORDER — VALACYCLOVIR HYDROCHLORIDE 1 G/1
1000 TABLET, FILM COATED ORAL 2 TIMES DAILY
Qty: 6 TABLET | Refills: 5 | Status: SHIPPED | OUTPATIENT
Start: 2025-01-16

## 2025-01-30 ENCOUNTER — PATIENT MESSAGE (OUTPATIENT)
Dept: FAMILY MEDICINE CLINIC | Facility: CLINIC | Age: 55
End: 2025-01-30
Payer: OTHER GOVERNMENT

## 2025-01-30 DIAGNOSIS — Z12.83 SCREENING FOR SKIN CANCER: Primary | ICD-10-CM

## 2025-02-05 RX ORDER — DICLOFENAC SODIUM 75 MG/1
75 TABLET, DELAYED RELEASE ORAL DAILY
Qty: 90 TABLET | Refills: 1 | Status: SHIPPED | OUTPATIENT
Start: 2025-02-05 | End: 2025-02-07

## 2025-02-07 RX ORDER — DICLOFENAC SODIUM 75 MG/1
75 TABLET, DELAYED RELEASE ORAL DAILY
Qty: 90 TABLET | Refills: 1 | Status: SHIPPED | OUTPATIENT
Start: 2025-02-07

## 2025-04-18 ENCOUNTER — TELEPHONE (OUTPATIENT)
Dept: FAMILY MEDICINE CLINIC | Facility: CLINIC | Age: 55
End: 2025-04-18
Payer: OTHER GOVERNMENT

## 2025-04-18 NOTE — TELEPHONE ENCOUNTER
[facility]   Confirming appointment with Luther Rodriguez on 4/21/25    Sent patient confirmation request via text through [employer]

## 2025-04-21 ENCOUNTER — OFFICE VISIT (OUTPATIENT)
Dept: FAMILY MEDICINE CLINIC | Facility: CLINIC | Age: 55
End: 2025-04-21
Payer: OTHER GOVERNMENT

## 2025-04-21 VITALS
WEIGHT: 165 LBS | TEMPERATURE: 98.2 F | DIASTOLIC BLOOD PRESSURE: 60 MMHG | OXYGEN SATURATION: 97 % | HEART RATE: 88 BPM | HEIGHT: 63 IN | RESPIRATION RATE: 18 BRPM | BODY MASS INDEX: 29.23 KG/M2 | SYSTOLIC BLOOD PRESSURE: 100 MMHG

## 2025-04-21 DIAGNOSIS — F17.211 CIGARETTE NICOTINE DEPENDENCE IN REMISSION: Primary | ICD-10-CM

## 2025-04-21 DIAGNOSIS — M54.41 ACUTE RIGHT-SIDED LOW BACK PAIN WITH RIGHT-SIDED SCIATICA: ICD-10-CM

## 2025-04-21 PROCEDURE — 99213 OFFICE O/P EST LOW 20 MIN: CPT | Performed by: NURSE PRACTITIONER

## 2025-04-21 RX ORDER — METHYLPREDNISOLONE 4 MG/1
TABLET ORAL
Qty: 21 TABLET | Refills: 0 | Status: SHIPPED | OUTPATIENT
Start: 2025-04-21

## 2025-04-21 RX ORDER — ACYCLOVIR 800 MG/1
800 TABLET ORAL 2 TIMES DAILY
COMMUNITY
Start: 2025-03-31

## 2025-04-21 RX ORDER — CYCLOBENZAPRINE HCL 5 MG
5 TABLET ORAL 3 TIMES DAILY PRN
Qty: 60 TABLET | Refills: 1 | Status: SHIPPED | OUTPATIENT
Start: 2025-04-21

## 2025-04-23 ENCOUNTER — RESULTS FOLLOW-UP (OUTPATIENT)
Dept: FAMILY MEDICINE CLINIC | Facility: CLINIC | Age: 55
End: 2025-04-23
Payer: OTHER GOVERNMENT

## 2025-05-06 ENCOUNTER — HOSPITAL ENCOUNTER (OUTPATIENT)
Dept: CT IMAGING | Facility: HOSPITAL | Age: 55
Discharge: HOME | End: 2025-05-06
Admitting: NURSE PRACTITIONER
Payer: OTHER GOVERNMENT

## 2025-05-06 DIAGNOSIS — F17.211 CIGARETTE NICOTINE DEPENDENCE IN REMISSION: ICD-10-CM

## 2025-05-06 PROCEDURE — 71271 CT THORAX LUNG CANCER SCR C-: CPT

## 2025-05-13 ENCOUNTER — HOSPITAL ENCOUNTER (OUTPATIENT)
Dept: OTHER | Facility: HOSPITAL | Age: 55
Discharge: HOME | End: 2025-05-13

## 2025-06-04 ENCOUNTER — PATIENT MESSAGE (OUTPATIENT)
Dept: FAMILY MEDICINE CLINIC | Facility: CLINIC | Age: 55
End: 2025-06-04
Payer: OTHER GOVERNMENT

## 2025-06-04 DIAGNOSIS — L30.9 DERMATITIS: Primary | ICD-10-CM

## 2025-06-04 DIAGNOSIS — M19.90 ARTHRITIS: ICD-10-CM

## 2025-06-05 RX ORDER — DICLOFENAC SODIUM 75 MG/1
75 TABLET, DELAYED RELEASE ORAL 2 TIMES DAILY
Qty: 180 TABLET | Refills: 1 | Status: SHIPPED | OUTPATIENT
Start: 2025-06-05

## 2025-06-05 RX ORDER — CLOTRIMAZOLE AND BETAMETHASONE DIPROPIONATE 10; .64 MG/G; MG/G
1 CREAM TOPICAL 2 TIMES DAILY
Qty: 30 G | Refills: 0 | Status: SHIPPED | OUTPATIENT
Start: 2025-06-05

## 2025-06-12 ENCOUNTER — OFFICE VISIT (OUTPATIENT)
Dept: NEUROSURGERY | Facility: CLINIC | Age: 55
End: 2025-06-12
Payer: OTHER GOVERNMENT

## 2025-06-12 VITALS
HEIGHT: 63 IN | DIASTOLIC BLOOD PRESSURE: 83 MMHG | WEIGHT: 163.6 LBS | BODY MASS INDEX: 28.99 KG/M2 | SYSTOLIC BLOOD PRESSURE: 138 MMHG

## 2025-06-12 DIAGNOSIS — M51.26 HERNIATED NUCLEUS PULPOSUS, L1-2 RIGHT: Primary | ICD-10-CM

## 2025-06-12 NOTE — PROGRESS NOTES
"Chief Complaint  Back Pain    Subjective            Carmen Vidal who is a 55 y.o. year old female who presents to [facility] for Evaluation of the Spine.     History of Present Illness  The patient is a 55-year-old female who presents for evaluation of a herniated disc.    The primary neurosurgical complaint is back pain, which began around 01/2025. The pain is localized to the back and was initially triggered by certain movements or positions. She describes the pain as a \"real zing\" when bending in specific ways. The pain progressively worsened to the point of being intolerable, with an initial severity rating of 7 out of 10. However, she reports significant improvement, with the current pain level at 2 out of 10. The pain does not radiate to her legs. She reports no numbness or tingling but occasionally experiences cold sensations in her right arm. An MRI, following an initial x-ray, confirmed the diagnosis of a herniated disc.     For treatment, she has been prescribed muscle relaxers and a prednisone pack, though she is uncertain about their effectiveness. She has found some relief with diclofenac, which she takes once at night and once during the day. The diclofenac has helped reduce the sensation of being \"jabbed with a hot poker\" that she experienced before starting the prednisone.    Social History:    Occupations: Retired from the army.    This patient  reports that she quit smoking about 14 years ago. Her smoking use included cigarettes. She has a 20 pack-year smoking history. She has never used smokeless tobacco.    Review of Systems   Musculoskeletal:  Positive for back pain.        Objective   Vital Signs:   /83 (BP Location: Left arm, Patient Position: Sitting)   Ht 160 cm (62.99\")   Wt 74.2 kg (163 lb 9.6 oz)   BMI 28.99 kg/m²       Physical Exam  Constitutional:       Appearance: She is normal weight.   Cardiovascular:      Comments: No notable " edema    Pulmonary:      Effort: Pulmonary effort is normal.   Neurological:      Mental Status: She is alert.      Sensory: No sensory deficit.      Motor: No weakness.   Psychiatric:         Mood and Affect: Mood normal.        Result Review :   I personally interpreted the patient's MRI scan with the patient.     Results  Imaging   - MRI of the lumbar spine: Herniated disk at L1-2 and degenerative disk disease in the upper part of the lumbar spine.        Assessment and Plan    Diagnoses and all orders for this visit:    1. Herniated nucleus pulposus, L1-2 right (Primary)        Assessment & Plan  1. Herniated disc.  Significant improvement has been noted in the patient's condition, with her pain level decreasing from a 7 to a 2. Approximately 80% of individuals with similar conditions experience improvement without surgical intervention. The herniation is located at the L1-L2 disc, which typically causes pain that radiates into the groin area. However, she does not report such symptoms. The herniation is not severe, and it is only minimally impinging on the nerve, which likely explains the absence of sciatica or radiating nerve pain. The spinal canal remains open despite the disc protrusion. Mild scoliosis is also present. Surgical intervention is not recommended at this time due to her improving condition.     Treatment Plan:  - Continue current medications: Diclofenac, taken once at night and once during the day, as it has been effective in reducing pain.  - Strengthen core and paraspinal muscles to provide support and reduce stress on the lower back, which could help prevent further deterioration over time. Specific exercises targeting the iliopsoas and rectus abdominal muscles should be incorporated.  - Avoid actions that could exacerbate her condition, such as turning while lifting.  - Monitor for pain in the right groin, which could indicate worsening of the herniation.  - Seek medical attention if a  flare-up is accompanied by significant leg pain or if it does not resolve within a short period.  - Patient education: Discussed the importance of core strengthening exercises and avoiding movements that could exacerbate her condition. Explained that while future flare-ups are possible, strengthening the core can reduce their frequency and duration. Emphasized the need to monitor for new symptoms such as groin pain and to seek medical attention if necessary.     Follow Up   No follow-ups on file.  Patient was given instructions and counseling regarding her condition or for health maintenance advice. Please see specific information pulled into the AVS if appropriate.     Patient or patient representative verbalized consent for the use of Ambient Listening during the visit with  Stuart Locke MD for chart documentation. 6/12/2025  15:07 EDT

## 2025-07-09 ENCOUNTER — TELEPHONE (OUTPATIENT)
Dept: NEUROSURGERY | Facility: CLINIC | Age: 55
End: 2025-07-09
Payer: OTHER GOVERNMENT

## 2025-07-09 NOTE — TELEPHONE ENCOUNTER
Provider: DR JACKSON    Caller: EAGLE MARINO    Relationship to Patient: SELF      Phone Number: [phone]    Reason for Call: PATIENT NEEDS A FORM SIGNED  FOR HER HEALTH SPENDING ACCOUNT TO PAY FOR THE VISIT WHEN SHE SAW DR SHEARER.  SHE IS WONDERING IF THERE IS AN EMAIL ADDRESS SHE CAN SEND THIS TO.  PLEASE ADVISE.    When was the patient last seen: 6-12-25

## 2025-07-09 NOTE — TELEPHONE ENCOUNTER
Called pt and adv that we do not have a secure email address for forms to be emailed to. Adv pt would need to bring form to office or fax it. Pt verbally understood

## 2025-07-11 DIAGNOSIS — M54.41 ACUTE RIGHT-SIDED LOW BACK PAIN WITH RIGHT-SIDED SCIATICA: ICD-10-CM

## 2025-07-11 RX ORDER — CYCLOBENZAPRINE HCL 5 MG
5 TABLET ORAL 3 TIMES DAILY PRN
Qty: 60 TABLET | Refills: 1 | Status: SHIPPED | OUTPATIENT
Start: 2025-07-11

## 2025-08-11 ENCOUNTER — TELEPHONE (OUTPATIENT)
Dept: FAMILY MEDICINE CLINIC | Facility: CLINIC | Age: 55
End: 2025-08-11
Payer: OTHER GOVERNMENT

## 2025-08-12 ENCOUNTER — PATIENT MESSAGE (OUTPATIENT)
Dept: FAMILY MEDICINE CLINIC | Facility: CLINIC | Age: 55
End: 2025-08-12
Payer: OTHER GOVERNMENT

## 2025-08-12 DIAGNOSIS — M54.41 ACUTE RIGHT-SIDED LOW BACK PAIN WITH RIGHT-SIDED SCIATICA: Primary | ICD-10-CM

## 2025-08-24 ENCOUNTER — PATIENT ROUNDING (BHMG ONLY) (OUTPATIENT)
Dept: URGENT CARE | Facility: CLINIC | Age: 55
End: 2025-08-24
Payer: OTHER GOVERNMENT

## 2025-08-28 ENCOUNTER — OFFICE VISIT (OUTPATIENT)
Dept: FAMILY MEDICINE CLINIC | Facility: CLINIC | Age: 55
End: 2025-08-28
Payer: OTHER GOVERNMENT

## 2025-08-28 VITALS
DIASTOLIC BLOOD PRESSURE: 76 MMHG | TEMPERATURE: 97.3 F | HEART RATE: 87 BPM | RESPIRATION RATE: 16 BRPM | WEIGHT: 166.8 LBS | SYSTOLIC BLOOD PRESSURE: 122 MMHG | BODY MASS INDEX: 29.55 KG/M2 | OXYGEN SATURATION: 97 % | HEIGHT: 63 IN

## 2025-08-28 DIAGNOSIS — K29.00 ACUTE GASTRITIS WITHOUT HEMORRHAGE, UNSPECIFIED GASTRITIS TYPE: ICD-10-CM

## 2025-08-28 DIAGNOSIS — L71.9 ROSACEA: Primary | ICD-10-CM

## 2025-08-28 PROCEDURE — 99213 OFFICE O/P EST LOW 20 MIN: CPT | Performed by: NURSE PRACTITIONER

## 2025-08-28 RX ORDER — METRONIDAZOLE 10 MG/G
GEL TOPICAL DAILY
Qty: 55 G | Refills: 2 | Status: SHIPPED | OUTPATIENT
Start: 2025-08-28

## 2025-08-28 RX ORDER — OMEPRAZOLE 40 MG/1
40 CAPSULE, DELAYED RELEASE ORAL DAILY
Qty: 30 CAPSULE | Refills: 0 | Status: SHIPPED | OUTPATIENT
Start: 2025-08-28

## (undated) DEVICE — SOL IRRG H2O PL/BG 1000ML STRL

## (undated) DEVICE — COLON KIT: Brand: MEDLINE INDUSTRIES, INC.

## (undated) DEVICE — Device: Brand: DEFENDO AIR/WATER/SUCTION AND BIOPSY VALVE